# Patient Record
Sex: FEMALE | Race: OTHER
[De-identification: names, ages, dates, MRNs, and addresses within clinical notes are randomized per-mention and may not be internally consistent; named-entity substitution may affect disease eponyms.]

---

## 2023-05-23 ENCOUNTER — HOSPITAL ENCOUNTER (OUTPATIENT)
Facility: HOSPITAL | Age: 38
Setting detail: SPECIMEN
Discharge: HOME | End: 2023-05-26

## 2023-05-23 ENCOUNTER — OFFICE VISIT (OUTPATIENT)
Age: 38
End: 2023-05-23

## 2023-05-23 VITALS
TEMPERATURE: 98.1 F | HEIGHT: 63 IN | BODY MASS INDEX: 31.47 KG/M2 | DIASTOLIC BLOOD PRESSURE: 67 MMHG | HEART RATE: 72 BPM | SYSTOLIC BLOOD PRESSURE: 121 MMHG | WEIGHT: 177.6 LBS | OXYGEN SATURATION: 98 %

## 2023-05-23 DIAGNOSIS — Z12.4 CERVICAL CANCER SCREENING: ICD-10-CM

## 2023-05-23 DIAGNOSIS — G89.29 CHRONIC BILATERAL LOW BACK PAIN WITH LEFT-SIDED SCIATICA: Primary | ICD-10-CM

## 2023-05-23 DIAGNOSIS — R30.0 DYSURIA: ICD-10-CM

## 2023-05-23 DIAGNOSIS — N76.1 SUBACUTE VAGINITIS: ICD-10-CM

## 2023-05-23 DIAGNOSIS — M54.42 CHRONIC BILATERAL LOW BACK PAIN WITH LEFT-SIDED SCIATICA: Primary | ICD-10-CM

## 2023-05-23 DIAGNOSIS — M43.16 SPONDYLOLISTHESIS OF LUMBAR REGION: ICD-10-CM

## 2023-05-23 LAB
AMORPH CRY URNS QL MICRO: ABNORMAL
APPEARANCE UR: ABNORMAL
BACTERIA URNS QL MICRO: ABNORMAL /HPF
BILIRUB UR QL: NEGATIVE
COLOR UR: ABNORMAL
EPITH CASTS URNS QL MICRO: ABNORMAL /LPF
GLUCOSE UR STRIP.AUTO-MCNC: NEGATIVE MG/DL
GRAN CASTS URNS QL MICRO: ABNORMAL /LPF
HGB UR QL STRIP: ABNORMAL
KETONES UR QL STRIP.AUTO: NEGATIVE MG/DL
LEUKOCYTE ESTERASE UR QL STRIP.AUTO: NEGATIVE
NITRITE UR QL STRIP.AUTO: NEGATIVE
PH UR STRIP: 5.5 (ref 5–8)
PROT UR STRIP-MCNC: NEGATIVE MG/DL
RBC #/AREA URNS HPF: ABNORMAL /HPF (ref 0–5)
SP GR UR REFRACTOMETRY: 1.02 (ref 1–1.03)
SPECIMEN HOLD: NORMAL
URINE CULTURE IF INDICATED: ABNORMAL
UROBILINOGEN UR QL STRIP.AUTO: 0.2 EU/DL (ref 0.2–1)
WBC URNS QL MICRO: ABNORMAL /HPF (ref 0–4)

## 2023-05-23 PROCEDURE — 88175 CYTOPATH C/V AUTO FLUID REDO: CPT

## 2023-05-23 PROCEDURE — 87591 N.GONORRHOEAE DNA AMP PROB: CPT

## 2023-05-23 PROCEDURE — 87491 CHLMYD TRACH DNA AMP PROBE: CPT

## 2023-05-23 PROCEDURE — 87801 DETECT AGNT MULT DNA AMPLI: CPT

## 2023-05-23 PROCEDURE — 81001 URINALYSIS AUTO W/SCOPE: CPT

## 2023-05-23 PROCEDURE — 87798 DETECT AGENT NOS DNA AMP: CPT

## 2023-05-23 PROCEDURE — 36415 COLL VENOUS BLD VENIPUNCTURE: CPT

## 2023-05-23 PROCEDURE — 99203 OFFICE O/P NEW LOW 30 MIN: CPT | Performed by: FAMILY MEDICINE

## 2023-05-23 PROCEDURE — 87624 HPV HI-RISK TYP POOLED RSLT: CPT

## 2023-05-23 PROCEDURE — 87661 TRICHOMONAS VAGINALIS AMPLIF: CPT

## 2023-05-23 RX ORDER — MEDROXYPROGESTERONE ACETATE 150 MG/ML
150 INJECTION, SUSPENSION INTRAMUSCULAR
COMMUNITY

## 2023-05-23 RX ORDER — FLUCONAZOLE 150 MG/1
150 TABLET ORAL ONCE
Qty: 1 TABLET | Refills: 0 | Status: SHIPPED | OUTPATIENT
Start: 2023-05-23 | End: 2023-05-23

## 2023-05-23 RX ORDER — CYCLOBENZAPRINE HCL 10 MG
10 TABLET ORAL NIGHTLY PRN
Qty: 30 TABLET | Refills: 1 | Status: SHIPPED | OUTPATIENT
Start: 2023-05-23

## 2023-05-23 RX ORDER — NAPROXEN 500 MG/1
500 TABLET ORAL 2 TIMES DAILY WITH MEALS
Qty: 180 TABLET | Refills: 1 | Status: SHIPPED | OUTPATIENT
Start: 2023-05-23

## 2023-05-23 ASSESSMENT — ENCOUNTER SYMPTOMS
COUGH: 0
NAUSEA: 0
ABDOMINAL PAIN: 0
SHORTNESS OF BREATH: 0
CONSTIPATION: 0
DIARRHEA: 0
BACK PAIN: 1

## 2023-05-23 NOTE — PROGRESS NOTES
Chief Complaint   Patient presents with    Back Pain     Hx of herniated disc    Vaginal Itching     Itching/burning/ discharge. Started about 2 months ago. First noticed after sexual intercourse. Has tried Clotrimazol Vaginal suppositories. Helped for a few days but problem came back.      /67 (Site: Left Upper Arm, Position: Sitting, Cuff Size: Medium Adult)   Pulse 72   Temp 98.1 °F (36.7 °C) (Temporal)   Ht 5' 2.6\" (1.59 m)   Wt 177 lb 9.6 oz (80.6 kg)   SpO2 98%   BMI 31.87 kg/m²

## 2023-05-23 NOTE — PROGRESS NOTES
[facility] services:  [id number]. Jorge Franklin LPN    Patient name and  verified by . Jorge Franklin LPN    Patient given coupons with instructions on how to use and present to pharmacy for medication discount. Reviewed how to and when to take medications with possible side effects. Dental resources information given and advised of clinics to reach out to first for care ( [facility] and [facility] ) . Advised to schedule next appointment before leaving [facility] today. Informed that AVS will be mailed to address on file. Time given for questions and answers, patient verbalized understanding.   Jorge Franklin LPN

## 2023-05-23 NOTE — PROGRESS NOTES
Laura Torres is a 44 y.o. female   Chief Complaint   Patient presents with    Back Pain     Hx of herniated disc    Vaginal Itching     Itching/burning/ discharge. Started about 2 months ago. First noticed after sexual intercourse. Has tried Clotrimazol Vaginal suppositories. Helped for a few days but problem came back. ASSESSMENT AND PLAN:    1. Chronic bilateral low back pain with left-sided sciatica  2. Spondylolisthesis of lumbar region  Follow up in 6 weeks. Consider PT +/- repeat imaging if uncontrolled. - cyclobenzaprine (FLEXERIL) 10 MG tablet; Take 1 tablet by mouth nightly as needed (back pain)  Dispense: 30 tablet; Refill: 1  - naproxen (NAPROSYN) 500 MG tablet; Take 1 tablet by mouth 2 times daily (with meals)  Dispense: 180 tablet; Refill: 1    3. Subacute vaginitis  Appears c/w candidal infection. Will give diflucan today. Nuswab sent for confirmation, additional testing.  - NuSwab Vaginitis Plus (VG+) with Candida (Six Species); Future  - fluconazole (DIFLUCAN) 150 MG tablet; Take 1 tablet by mouth once for 1 dose  Dispense: 1 tablet; Refill: 0    4. Cervical cancer screening  Specimen obtained and sent to lab for cotesting. Followup per [facility]CCP guidelines. - PAP IG, Aptima HPV and rfx 16/18,45 ([id number]); Future    5. Dysuria  - Urinalysis with Reflex to Culture; Future    SUBJECTIVE:    HPI:  Laura Torres is a 44 y.o. female who presents to clinic primarily concerned about 2 months of vaginal discharge, itching and irritation. At the start she took diflucan and it improved but then returned. Recently she bought a topical cream but burned on application. No history of STIs. One partner. The symptoms started after intercourse. Her partner had itching at first but took fluconazole and it resolved. She also endorses pelvic pain. She has suprapubic pain after urination, urinary frequency, and urgency. Her voids are small.   No fevers, no flank

## 2023-05-27 LAB
A VAGINAE DNA VAG QL NAA+PROBE: ABNORMAL SCORE
BVAB2 DNA VAG QL NAA+PROBE: ABNORMAL SCORE
C ALBICANS DNA VAG QL NAA+PROBE: POSITIVE
C GLABRATA DNA VAG QL NAA+PROBE: NEGATIVE
C TRACH RRNA SPEC QL NAA+PROBE: NEGATIVE
CANDIDA KRUSEI: ABNORMAL
CANDIDA LUSITANIAE, NAA, 180015: ABNORMAL
CANDIDA PARAPSILOSIS/TROPICALIS: ABNORMAL
MEGA1 DNA VAG QL NAA+PROBE: ABNORMAL SCORE
N GONORRHOEA RRNA SPEC QL NAA+PROBE: NEGATIVE
T VAGINALIS RRNA SPEC QL NAA+PROBE: NEGATIVE

## 2023-05-30 DIAGNOSIS — N76.1 SUBACUTE VAGINITIS: ICD-10-CM

## 2023-05-30 DIAGNOSIS — N30.00 ACUTE CYSTITIS WITHOUT HEMATURIA: Primary | ICD-10-CM

## 2023-05-30 RX ORDER — NITROFURANTOIN 25; 75 MG/1; MG/1
100 CAPSULE ORAL 2 TIMES DAILY
Qty: 20 CAPSULE | Refills: 0 | Status: SHIPPED | OUTPATIENT
Start: 2023-05-30 | End: 2023-06-09

## 2023-05-30 RX ORDER — FLUCONAZOLE 150 MG/1
150 TABLET ORAL
Qty: 2 TABLET | Refills: 0 | Status: SHIPPED | OUTPATIENT
Start: 2023-05-30 | End: 2023-06-05

## 2023-05-31 LAB
A VAGINAE DNA VAG QL NAA+PROBE: ABNORMAL SCORE
BVAB2 DNA VAG QL NAA+PROBE: ABNORMAL SCORE
C ALBICANS DNA VAG QL NAA+PROBE: POSITIVE
C GLABRATA DNA VAG QL NAA+PROBE: NEGATIVE
C TRACH RRNA SPEC QL NAA+PROBE: NEGATIVE
CANDIDA KRUSEI: NEGATIVE
CANDIDA LUSITANIAE, NAA, 180015: NEGATIVE
CANDIDA PARAPSILOSIS/TROPICALIS: NEGATIVE
MEGA1 DNA VAG QL NAA+PROBE: ABNORMAL SCORE
N GONORRHOEA RRNA SPEC QL NAA+PROBE: NEGATIVE
T VAGINALIS RRNA SPEC QL NAA+PROBE: NEGATIVE

## 2023-06-01 ENCOUNTER — TELEPHONE (OUTPATIENT)
Age: 38
End: 2023-06-01

## 2023-06-01 NOTE — CONSULTS
Session ID: [id number]  Request ID: [id number]  Language: Slovak  Status: Fulfilled   ID: #[id number]   Name: Sal Mills

## 2023-07-11 ENCOUNTER — OFFICE VISIT (OUTPATIENT)
Age: 38
End: 2023-07-11

## 2023-07-11 VITALS
DIASTOLIC BLOOD PRESSURE: 74 MMHG | BODY MASS INDEX: 32.26 KG/M2 | TEMPERATURE: 98.1 F | SYSTOLIC BLOOD PRESSURE: 128 MMHG | OXYGEN SATURATION: 98 % | HEART RATE: 72 BPM | WEIGHT: 179.8 LBS

## 2023-07-11 DIAGNOSIS — G89.29 CHRONIC BILATERAL LOW BACK PAIN WITH LEFT-SIDED SCIATICA: ICD-10-CM

## 2023-07-11 DIAGNOSIS — M54.42 CHRONIC BILATERAL LOW BACK PAIN WITH LEFT-SIDED SCIATICA: ICD-10-CM

## 2023-07-11 DIAGNOSIS — K08.89 PAIN, DENTAL: Primary | ICD-10-CM

## 2023-07-11 PROCEDURE — 99214 OFFICE O/P EST MOD 30 MIN: CPT | Performed by: FAMILY MEDICINE

## 2023-07-11 RX ORDER — AMOXICILLIN AND CLAVULANATE POTASSIUM 875; 125 MG/1; MG/1
1 TABLET, FILM COATED ORAL 2 TIMES DAILY
Qty: 20 TABLET | Refills: 0 | Status: SHIPPED | OUTPATIENT
Start: 2023-07-11 | End: 2023-07-21

## 2023-07-11 RX ORDER — IBUPROFEN 200 MG
200 TABLET ORAL DAILY
COMMUNITY

## 2023-07-11 ASSESSMENT — PATIENT HEALTH QUESTIONNAIRE - PHQ9
SUM OF ALL RESPONSES TO PHQ9 QUESTIONS 1 & 2: 6
5. POOR APPETITE OR OVEREATING: 3
SUM OF ALL RESPONSES TO PHQ QUESTIONS 1-9: 16
7. TROUBLE CONCENTRATING ON THINGS, SUCH AS READING THE NEWSPAPER OR WATCHING TELEVISION: 0
4. FEELING TIRED OR HAVING LITTLE ENERGY: 3
8. MOVING OR SPEAKING SO SLOWLY THAT OTHER PEOPLE COULD HAVE NOTICED. OR THE OPPOSITE, BEING SO FIGETY OR RESTLESS THAT YOU HAVE BEEN MOVING AROUND A LOT MORE THAN USUAL: 0
SUM OF ALL RESPONSES TO PHQ QUESTIONS 1-9: 16
SUM OF ALL RESPONSES TO PHQ QUESTIONS 1-9: 16
6. FEELING BAD ABOUT YOURSELF - OR THAT YOU ARE A FAILURE OR HAVE LET YOURSELF OR YOUR FAMILY DOWN: 1
3. TROUBLE FALLING OR STAYING ASLEEP: 3
10. IF YOU CHECKED OFF ANY PROBLEMS, HOW DIFFICULT HAVE THESE PROBLEMS MADE IT FOR YOU TO DO YOUR WORK, TAKE CARE OF THINGS AT HOME, OR GET ALONG WITH OTHER PEOPLE: 2
9. THOUGHTS THAT YOU WOULD BE BETTER OFF DEAD, OR OF HURTING YOURSELF: 0
2. FEELING DOWN, DEPRESSED OR HOPELESS: 3
SUM OF ALL RESPONSES TO PHQ QUESTIONS 1-9: 16
1. LITTLE INTEREST OR PLEASURE IN DOING THINGS: 3

## 2023-07-11 ASSESSMENT — ENCOUNTER SYMPTOMS
COUGH: 0
BACK PAIN: 1
CONSTIPATION: 0
SHORTNESS OF BREATH: 0
NAUSEA: 0
ABDOMINAL PAIN: 0
DIARRHEA: 0

## 2023-07-11 ASSESSMENT — LIFESTYLE VARIABLES
HOW MANY STANDARD DRINKS CONTAINING ALCOHOL DO YOU HAVE ON A TYPICAL DAY: PATIENT DOES NOT DRINK
HOW OFTEN DO YOU HAVE A DRINK CONTAINING ALCOHOL: NEVER

## 2023-07-11 NOTE — PROGRESS NOTES
The pt verified her name and . Pt given the Dental resources sheet. Printed AVS, provided to pt and reviewed. Pt indicated understanding and had no questions. Patient was given a [employer] com coupon to provide to the pharmacy to receive discounted price for medication. Reviewed the medication and the additional pain otc relief med Orasol. Explained Access Now process to the pt. The pt was told they would need to make an appt with the OW, who will meet with them for Financial information and assist them in the application process.  Kasey Voss RN

## 2023-07-12 ENCOUNTER — TELEPHONE (OUTPATIENT)
Age: 38
End: 2023-07-12

## 2023-07-13 ENCOUNTER — TELEPHONE (OUTPATIENT)
Age: 38
End: 2023-07-13

## 2023-07-13 NOTE — TELEPHONE ENCOUNTER
OW called patient to assist with medical bills. Patient sent a copy of her bills to OW. Patient's bill was sent to supervisor via [address] [place] [address]. OW explained to the patient that it is a billing error and she doesn't have to pay for it.

## 2025-05-13 ENCOUNTER — TRANSCRIBE ORDERS (OUTPATIENT)
Facility: HOSPITAL | Age: 40
End: 2025-05-13

## 2025-05-13 DIAGNOSIS — N92.1 MENORRHAGIA WITH IRREGULAR CYCLE: Primary | ICD-10-CM

## 2025-05-23 ENCOUNTER — HOSPITAL ENCOUNTER (OUTPATIENT)
Facility: HOSPITAL | Age: 40
Discharge: HOME | End: 2025-05-26

## 2025-05-23 DIAGNOSIS — N92.1 MENORRHAGIA WITH IRREGULAR CYCLE: ICD-10-CM

## 2025-05-23 PROCEDURE — 76830 TRANSVAGINAL US NON-OB: CPT

## 2025-05-23 PROCEDURE — 76856 US EXAM PELVIC COMPLETE: CPT
